# Patient Record
Sex: MALE | Race: WHITE | NOT HISPANIC OR LATINO | Employment: FULL TIME | URBAN - METROPOLITAN AREA
[De-identification: names, ages, dates, MRNs, and addresses within clinical notes are randomized per-mention and may not be internally consistent; named-entity substitution may affect disease eponyms.]

---

## 2021-09-07 ENCOUNTER — OFFICE VISIT (OUTPATIENT)
Dept: ORTHOPEDIC SURGERY | Facility: CLINIC | Age: 41
End: 2021-09-07

## 2021-09-07 VITALS
HEART RATE: 71 BPM | DIASTOLIC BLOOD PRESSURE: 90 MMHG | BODY MASS INDEX: 35 KG/M2 | WEIGHT: 250 LBS | SYSTOLIC BLOOD PRESSURE: 128 MMHG | HEIGHT: 71 IN

## 2021-09-07 DIAGNOSIS — M25.561 MECHANICAL KNEE PAIN, RIGHT: ICD-10-CM

## 2021-09-07 DIAGNOSIS — S83.411A SPRAIN OF MEDIAL COLLATERAL LIGAMENT OF RIGHT KNEE, INITIAL ENCOUNTER: ICD-10-CM

## 2021-09-07 DIAGNOSIS — M25.561 RIGHT KNEE PAIN, UNSPECIFIED CHRONICITY: Primary | ICD-10-CM

## 2021-09-07 PROCEDURE — 99203 OFFICE O/P NEW LOW 30 MIN: CPT | Performed by: ORTHOPAEDIC SURGERY

## 2021-09-07 PROCEDURE — 73562 X-RAY EXAM OF KNEE 3: CPT | Performed by: ORTHOPAEDIC SURGERY

## 2021-09-07 RX ORDER — METHYLPREDNISOLONE 4 MG/1
TABLET ORAL
COMMUNITY
Start: 2021-08-30 | End: 2021-09-28

## 2021-09-07 NOTE — PROGRESS NOTES
"Subjective:     Patient ID: Edgardo Ahumada is a 41 y.o. male.    Chief Complaint:  Right knee pain, new patient  History of Present Illness  Edgardo Ahumada presents to clinic today for evaluation of right knee pain. The patient reports the onset of his pain stated approximately 2 months ago. He states no know injury to the knee, he is assuming it could be from \"getting on and off tractors and stuff, so maybe some kind of jarring or something.\" He reports moderate relief for approximately 1.5 to 2 weeks with ibuprofen, ice, and wearing a knee brace. The patient reports a reoccurrence of pain and swelling that he currently rates a 9 to 10 out of 10. He describes his pain as aching, stabbing, sharp, shooting, and throbbing in nature, localized medially aspect of the right knee. The patient reports he has been avoiding weight barring or anything to irritate it.  He states he was in bed for approximately 12 to 13 days with COVID, and all that rest did not improve in his pain. He denies locking or catching in the knee.     He reports he had a steroid injection and steroid pack from urgent care that provided him significant relief, until the steroid pack wore off.     The patient is accompanied by an adult female who contributes in his history of care     Social History     Occupational History   • Not on file   Tobacco Use   • Smoking status: Never Smoker   • Smokeless tobacco: Never Used   Vaping Use   • Vaping Use: Never used   Substance and Sexual Activity   • Alcohol use: No   • Drug use: No   • Sexual activity: Defer      Past Medical History:   Diagnosis Date   • Fracture of wrist     right thumb   • Gout      Past Surgical History:   Procedure Laterality Date   • EYE SURGERY     • NASAL SEPTUM SURGERY     • VASECTOMY         Family History   Problem Relation Age of Onset   • Diabetes Mother    • Hypertension Mother    • Diabetes Father          Review of Systems   Constitutional: Negative for chills, diaphoresis, fever and " "unexpected weight change.   HENT: Negative for hearing loss, nosebleeds, sore throat and tinnitus.    Eyes: Negative for pain and visual disturbance.   Respiratory: Negative for cough, shortness of breath and wheezing.    Cardiovascular: Negative for chest pain and palpitations.   Gastrointestinal: Negative for abdominal pain, diarrhea, nausea and vomiting.   Endocrine: Negative for cold intolerance, heat intolerance and polydipsia.   Genitourinary: Negative for difficulty urinating, dysuria and hematuria.   Musculoskeletal: Positive for arthralgias, joint swelling and myalgias.   Skin: Negative for rash and wound.   Allergic/Immunologic: Negative for environmental allergies.   Neurological: Negative for dizziness, syncope and numbness.   Hematological: Does not bruise/bleed easily.   Psychiatric/Behavioral: Negative for dysphoric mood and sleep disturbance. The patient is not nervous/anxious.            Objective:  Vitals:    09/07/21 1127   BP: 128/90   Pulse: 71   Weight: 113 kg (250 lb)   Height: 179.1 cm (70.5\")         09/07/21  1127   Weight: 113 kg (250 lb)     Body mass index is 35.36 kg/m².  Physical Exam    Vital signs reviewed.   General: No acute distress, alert and oriented  Eyes: conjunctiva clear; pupils equally round and reactive  ENT: external ears and nose atraumatic; oropharynx clear  CV: no peripheral edema  Resp: normal respiratory effort  Skin: no rashes or wounds; normal turgor  Psych: mood and affect appropriate; recent and remote memory intact          Ortho Exam     Right Knee-     ROM 2 to 95 degrees  4/5 on flexion  4/5 on extension  Maximal tenderness over proximal MCL at the medial femoral condyle.  Effusion- moderate  Grade 1A Lachman  Anterior drawer- negative  Posterior drawer- negative  Stable opening on varus and valgus stress at 2 and 30  Active patellar compression test- mildly positive  Log roll-  negative  Stinchfield-  negative   Positive sensation light touch all " distributions symmetric to contralateral side  Brisk cap refill all digits  2+ dorsalis pedis pulse  Imaging:  Right Knee X-Ray  Indication: Pain    AP, Lateral, and [place] views    Findings:  No fracture  No bony lesion  Normal soft tissues  Normal joint spaces    No prior studies were available for comparison.    Assessment:        1. Right knee pain, unspecified chronicity    2. Mechanical knee pain, right    3. Sprain of medial collateral ligament of right knee, initial encounter           Plan:          1. Discussed treatment options at length with patient at today's visit.  2. MRI of right knee ordered.  3. Oral steroid sent to patients pharmacy for inflammation and pain control.  4. Follow up: after MRI is obtained.       Edgardo Ahumada was in agreement with plan and had all questions answered.     Orders:  Orders Placed This Encounter   Procedures   • XR Knee 3+ View With [place] Right   • MRI Knee Right Without Contrast       Medications:  No orders of the defined types were placed in this encounter.      Followup:  Return for review of MRI results.    Diagnoses and all orders for this visit:    1. Right knee pain, unspecified chronicity (Primary)  -     XR Knee 3+ View With [place] Right    2. Mechanical knee pain, right  -     MRI Knee Right Without Contrast; Future    3. Sprain of medial collateral ligament of right knee, initial encounter  -     MRI Knee Right Without Contrast; Future          Dictated utilizing Dragon dictation     Scribed for Jethro Alberto MD by Cynthia Carr.  9/19/2021  13:39 EDT

## 2021-09-23 ENCOUNTER — HOSPITAL ENCOUNTER (OUTPATIENT)
Dept: MRI IMAGING | Facility: HOSPITAL | Age: 41
Discharge: HOME | End: 2021-09-23
Admitting: ORTHOPAEDIC SURGERY

## 2021-09-23 DIAGNOSIS — S83.411A SPRAIN OF MEDIAL COLLATERAL LIGAMENT OF RIGHT KNEE, INITIAL ENCOUNTER: ICD-10-CM

## 2021-09-23 DIAGNOSIS — M25.561 MECHANICAL KNEE PAIN, RIGHT: ICD-10-CM

## 2021-09-23 PROCEDURE — 73721 MRI JNT OF LWR EXTRE W/O DYE: CPT

## 2021-09-28 ENCOUNTER — OFFICE VISIT (OUTPATIENT)
Dept: ORTHOPEDIC SURGERY | Facility: CLINIC | Age: 41
End: 2021-09-28

## 2021-09-28 VITALS — HEIGHT: 71 IN | WEIGHT: 152 LBS | BODY MASS INDEX: 21.28 KG/M2

## 2021-09-28 DIAGNOSIS — S83.411A SPRAIN OF MEDIAL COLLATERAL LIGAMENT OF RIGHT KNEE, INITIAL ENCOUNTER: Primary | ICD-10-CM

## 2021-09-28 DIAGNOSIS — M25.461 EFFUSION OF RIGHT KNEE: ICD-10-CM

## 2021-09-28 PROCEDURE — 99214 OFFICE O/P EST MOD 30 MIN: CPT | Performed by: ORTHOPAEDIC SURGERY

## 2021-09-28 PROCEDURE — 20610 DRAIN/INJ JOINT/BURSA W/O US: CPT | Performed by: ORTHOPAEDIC SURGERY

## 2021-09-28 RX ORDER — IBUPROFEN 800 MG/1
800 TABLET ORAL EVERY 6 HOURS PRN
COMMUNITY

## 2021-09-28 RX ADMIN — TRIAMCINOLONE ACETONIDE 80 MG: 40 INJECTION, SUSPENSION INTRA-ARTICULAR; INTRAMUSCULAR at 09:48

## 2021-09-28 RX ADMIN — LIDOCAINE HYDROCHLORIDE 8 ML: 10 INJECTION, SOLUTION EPIDURAL; INFILTRATION; INTRACAUDAL; PERINEURAL at 09:48

## 2021-10-13 RX ORDER — LIDOCAINE HYDROCHLORIDE 10 MG/ML
8 INJECTION, SOLUTION EPIDURAL; INFILTRATION; INTRACAUDAL; PERINEURAL
Status: COMPLETED | OUTPATIENT
Start: 2021-09-28 | End: 2021-09-28

## 2021-10-13 RX ORDER — TRIAMCINOLONE ACETONIDE 40 MG/ML
80 INJECTION, SUSPENSION INTRA-ARTICULAR; INTRAMUSCULAR
Status: COMPLETED | OUTPATIENT
Start: 2021-09-28 | End: 2021-09-28